# Patient Record
Sex: MALE | Race: WHITE | NOT HISPANIC OR LATINO | URBAN - METROPOLITAN AREA
[De-identification: names, ages, dates, MRNs, and addresses within clinical notes are randomized per-mention and may not be internally consistent; named-entity substitution may affect disease eponyms.]

---

## 2017-04-04 ENCOUNTER — OFFICE VISIT - HEALTHEAST (OUTPATIENT)
Dept: FAMILY MEDICINE | Facility: CLINIC | Age: 52
End: 2017-04-04

## 2017-04-04 DIAGNOSIS — L30.0 NUMMULAR ECZEMATOUS DERMATITIS: ICD-10-CM

## 2017-04-04 RX ORDER — TRIAMCINOLONE ACETONIDE 5 MG/G
OINTMENT TOPICAL
Qty: 30 G | Refills: 1 | Status: SHIPPED | OUTPATIENT
Start: 2017-04-04

## 2017-04-04 ASSESSMENT — MIFFLIN-ST. JEOR: SCORE: 1626.93

## 2017-04-25 ENCOUNTER — COMMUNICATION - HEALTHEAST (OUTPATIENT)
Dept: FAMILY MEDICINE | Facility: CLINIC | Age: 52
End: 2017-04-25

## 2017-05-22 ENCOUNTER — RECORDS - HEALTHEAST (OUTPATIENT)
Dept: ADMINISTRATIVE | Facility: OTHER | Age: 52
End: 2017-05-22

## 2021-05-30 VITALS — BODY MASS INDEX: 27.48 KG/M2 | HEIGHT: 68 IN | WEIGHT: 181.31 LBS

## 2021-06-09 NOTE — PROGRESS NOTES
"  Assessment/Plan:         1. Nummular eczematous dermatitis       Symptoms are consistent with an acute eczema.  I prescribed triamcinolone cream to apply to the area twice daily for 14 days.  And thereafter as needed.  I encouraged patient to follow-up if he does not notice a significant improvement of the rash in 1-2 weeks.  He is in agreement with this plan and will follow-up as needed.       Subjective:      Umer Hines is a 51 y.o. male who presents for rash on the back of his right leg. He noticed this about 6 months ago. It started out as two small puncture like wounds. It has continued to progress and get larger. It is currently about 1.5\" in diameter. He has been applying neosporin to the lesion and it will not become dry but will turn dark red. It is only painful when it drys out.   He also describes lesion as pruritic which waxes and wanes in intensity.  He initially thought that this was likely a spider bite however given the longevity of this he is concerned it may be something else.  He has never had symptoms similar to this in the past.  No other recent contacts with other similar symptoms.  It has been persistent for the last 6 months it has not completely improved.    The following portions of the patient's history were reviewed and updated as appropriate: allergies, current medications and problem list.    Review of Systems   Pertinent items are noted in HPI.      Objective:      /86 (Patient Site: Right Arm, Patient Position: Sitting, Cuff Size: Adult Regular)  Pulse 88  Temp 98.2  F (36.8  C) (Oral)   Resp 16  Ht 5' 8\" (1.727 m)  Wt 181 lb 5 oz (82.2 kg)  BMI 27.57 kg/m2    General appearance: alert, appears stated age and cooperative  Skin: 4.5 cm x 4 cm excoriated dry lesion present on posterior right ankle near the Achilles tendon.  Erythematous base with dry skin flaking off.  No pain to palpation skin is blanchable.  Neuro: Grossly Normal.  "